# Patient Record
Sex: MALE | Race: BLACK OR AFRICAN AMERICAN | Employment: FULL TIME
[De-identification: names, ages, dates, MRNs, and addresses within clinical notes are randomized per-mention and may not be internally consistent; named-entity substitution may affect disease eponyms.]

---

## 2024-03-15 ENCOUNTER — OFFICE VISIT (OUTPATIENT)
Facility: CLINIC | Age: 64
End: 2024-03-15
Payer: COMMERCIAL

## 2024-03-15 VITALS
RESPIRATION RATE: 20 BRPM | OXYGEN SATURATION: 97 % | WEIGHT: 253 LBS | BODY MASS INDEX: 34.27 KG/M2 | SYSTOLIC BLOOD PRESSURE: 148 MMHG | HEIGHT: 72 IN | TEMPERATURE: 98 F | HEART RATE: 72 BPM | DIASTOLIC BLOOD PRESSURE: 92 MMHG

## 2024-03-15 DIAGNOSIS — Z12.5 SCREENING PSA (PROSTATE SPECIFIC ANTIGEN): ICD-10-CM

## 2024-03-15 DIAGNOSIS — I10 ESSENTIAL HYPERTENSION: ICD-10-CM

## 2024-03-15 DIAGNOSIS — Z79.4 TYPE 2 DIABETES MELLITUS WITHOUT COMPLICATION, WITH LONG-TERM CURRENT USE OF INSULIN (HCC): Primary | ICD-10-CM

## 2024-03-15 DIAGNOSIS — E11.9 TYPE 2 DIABETES MELLITUS WITHOUT COMPLICATION, WITH LONG-TERM CURRENT USE OF INSULIN (HCC): Primary | ICD-10-CM

## 2024-03-15 DIAGNOSIS — Z11.4 ENCOUNTER FOR SCREENING FOR HIV: ICD-10-CM

## 2024-03-15 DIAGNOSIS — E78.5 HYPERLIPIDEMIA, UNSPECIFIED HYPERLIPIDEMIA TYPE: ICD-10-CM

## 2024-03-15 DIAGNOSIS — Z11.59 NEED FOR HEPATITIS C SCREENING TEST: ICD-10-CM

## 2024-03-15 PROCEDURE — 3077F SYST BP >= 140 MM HG: CPT | Performed by: FAMILY MEDICINE

## 2024-03-15 PROCEDURE — 3079F DIAST BP 80-89 MM HG: CPT | Performed by: FAMILY MEDICINE

## 2024-03-15 PROCEDURE — 99215 OFFICE O/P EST HI 40 MIN: CPT | Performed by: FAMILY MEDICINE

## 2024-03-15 RX ORDER — SILDENAFIL 100 MG/1
100 TABLET, FILM COATED ORAL PRN
COMMUNITY
Start: 2024-03-03

## 2024-03-15 SDOH — ECONOMIC STABILITY: FOOD INSECURITY: WITHIN THE PAST 12 MONTHS, YOU WORRIED THAT YOUR FOOD WOULD RUN OUT BEFORE YOU GOT MONEY TO BUY MORE.: NEVER TRUE

## 2024-03-15 SDOH — ECONOMIC STABILITY: HOUSING INSECURITY
IN THE LAST 12 MONTHS, WAS THERE A TIME WHEN YOU DID NOT HAVE A STEADY PLACE TO SLEEP OR SLEPT IN A SHELTER (INCLUDING NOW)?: NO

## 2024-03-15 SDOH — ECONOMIC STABILITY: FOOD INSECURITY: WITHIN THE PAST 12 MONTHS, THE FOOD YOU BOUGHT JUST DIDN'T LAST AND YOU DIDN'T HAVE MONEY TO GET MORE.: NEVER TRUE

## 2024-03-15 SDOH — ECONOMIC STABILITY: INCOME INSECURITY: HOW HARD IS IT FOR YOU TO PAY FOR THE VERY BASICS LIKE FOOD, HOUSING, MEDICAL CARE, AND HEATING?: NOT HARD AT ALL

## 2024-03-15 ASSESSMENT — PATIENT HEALTH QUESTIONNAIRE - PHQ9
SUM OF ALL RESPONSES TO PHQ QUESTIONS 1-9: 0
2. FEELING DOWN, DEPRESSED OR HOPELESS: 0
1. LITTLE INTEREST OR PLEASURE IN DOING THINGS: 0
SUM OF ALL RESPONSES TO PHQ9 QUESTIONS 1 & 2: 0

## 2024-03-15 NOTE — PROGRESS NOTES
Federico Galaviz (:  1960) is a 63 y.o. male,Established patient, here for evaluation of the following chief complaint(s):  Establish Care, Hypertension, and Diabetes         ASSESSMENT/PLAN:  1. Type 2 diabetes mellitus without complication, with long-term current use of insulin (HCC)  -     Hemoglobin A1C; Future  -     Lipid Panel; Future  -     Microalbumin / Creatinine Urine Ratio; Future  -     Comprehensive Metabolic Panel; Future  2. Essential hypertension  -     Lipid Panel; Future  -     Comprehensive Metabolic Panel; Future  3. Hyperlipidemia, unspecified hyperlipidemia type  -     Lipid Panel; Future  -     Comprehensive Metabolic Panel; Future  4. Encounter for screening for HIV  -     HIV 1/2 Ag/Ab, 4TH Generation,W Rflx Confirm; Future  5. Need for hepatitis C screening test  -     Hepatitis C Antibody; Future  6. Screening PSA (prostate specific antigen)  -     PSA Screening; Future    DM- Uncontrolled.  Followed by Endocrinology.  Currently not on Ozempic per patient.  Repeat A1C. Low carbohydrate diet encouraged.  Exercise for 30 minutes  3 to 5 days per week.        HTN- Uncontrolled. Patient reports being in pain.   He needs surgery on both shoulders.  Continue current meds and doses for now. Modify diet.  Limit salt intake to 2 grams  a day.  Advised  aerobic exercise for 30 minutes 3 to 5 times a week.  Take meds consistently as prescribed.     HLD- Stable.   Continue rosuvastatin 20 mg a day.  Decrease fried foods fast foods and saturated fats.           Return in about 4 weeks (around 2024) for HTN, DM, HLD, OV extended.         Subjective   SUBJECTIVE/OBJECTIVE:  64 yo male presents to establish care.   Former patient of [facility].    Hx of diabetes, HLD, BETTE w/o CPAP, arthritis in both knees and right shoulder, and a history of heart murmur.  Patient did complete both cataract eye surgery in  and 2023.    Patient Care Team:

## 2024-03-16 LAB
ALBUMIN SERPL-MCNC: 4.7 G/DL (ref 3.9–4.9)
ALBUMIN/GLOB SERPL: 1.4 {RATIO} (ref 1.2–2.2)
ALP SERPL-CCNC: 82 IU/L (ref 44–121)
ALT SERPL-CCNC: 15 IU/L (ref 0–44)
AST SERPL-CCNC: 18 IU/L (ref 0–40)
BILIRUB SERPL-MCNC: 0.8 MG/DL (ref 0–1.2)
BUN SERPL-MCNC: 21 MG/DL (ref 8–27)
BUN/CREAT SERPL: 20 (ref 10–24)
CALCIUM SERPL-MCNC: 10.5 MG/DL (ref 8.6–10.2)
CHLORIDE SERPL-SCNC: 99 MMOL/L (ref 96–106)
CHOLEST SERPL-MCNC: 186 MG/DL (ref 100–199)
CO2 SERPL-SCNC: 22 MMOL/L (ref 20–29)
CREAT SERPL-MCNC: 1.06 MG/DL (ref 0.76–1.27)
EGFRCR SERPLBLD CKD-EPI 2021: 80 ML/MIN/1.73
GLOBULIN SER CALC-MCNC: 3.3 G/DL (ref 1.5–4.5)
GLUCOSE SERPL-MCNC: 126 MG/DL (ref 70–99)
HBA1C MFR BLD: 7.9 % (ref 4.8–5.6)
HDLC SERPL-MCNC: 58 MG/DL
HIV 1+2 AB+HIV1 P24 AG SERPL QL IA: NON REACTIVE
LDLC SERPL CALC-MCNC: 104 MG/DL (ref 0–99)
POTASSIUM SERPL-SCNC: 3.9 MMOL/L (ref 3.5–5.2)
PROT SERPL-MCNC: 8 G/DL (ref 6–8.5)
PSA SERPL-MCNC: 0.7 NG/ML (ref 0–4)
SODIUM SERPL-SCNC: 142 MMOL/L (ref 134–144)
TRIGL SERPL-MCNC: 140 MG/DL (ref 0–149)
VLDLC SERPL CALC-MCNC: 24 MG/DL (ref 5–40)

## 2024-03-16 ASSESSMENT — ENCOUNTER SYMPTOMS
BACK PAIN: 1
SHORTNESS OF BREATH: 0
ABDOMINAL PAIN: 0

## 2024-03-17 LAB
ALBUMIN/CREAT UR: 6 MG/G CREAT (ref 0–29)
CREAT UR-MCNC: 52.9 MG/DL
MICROALBUMIN UR-MCNC: 3 UG/ML

## 2024-03-20 LAB — HCV IGG SERPL QL IA: REACTIVE

## 2024-03-23 LAB — HCV IGG SERPL QL IA: REACTIVE

## 2024-04-07 DIAGNOSIS — E83.52 HYPERCALCEMIA: ICD-10-CM

## 2024-05-03 ENCOUNTER — PATIENT MESSAGE (OUTPATIENT)
Facility: CLINIC | Age: 64
End: 2024-05-03

## 2024-05-06 RX ORDER — SILDENAFIL 100 MG/1
100 TABLET, FILM COATED ORAL PRN
Qty: 30 TABLET | Refills: 5 | Status: SHIPPED | OUTPATIENT
Start: 2024-05-06

## 2024-05-06 NOTE — TELEPHONE ENCOUNTER
From: Federico Galaviz  To: Dr. Svetlana Culver  Sent: 5/3/2024 8:42 AM EDT  Subject: Sildenafil Citrate 100 MG refill please      [facility]  [address] [place] [address]  Sildenafil Citrate 100 MG take 1 tablet by mouth if needed BEFORE DESRIED SEXUAL ACTIVITY 30  #10 Tablet with 5 refill(s)

## 2024-05-28 ENCOUNTER — PATIENT MESSAGE (OUTPATIENT)
Facility: CLINIC | Age: 64
End: 2024-05-28

## 2024-05-29 DIAGNOSIS — Z79.4 TYPE 2 DIABETES MELLITUS WITHOUT COMPLICATION, WITH LONG-TERM CURRENT USE OF INSULIN (HCC): ICD-10-CM

## 2024-05-29 DIAGNOSIS — E78.5 HYPERLIPIDEMIA, UNSPECIFIED HYPERLIPIDEMIA TYPE: Primary | ICD-10-CM

## 2024-05-29 DIAGNOSIS — E11.9 TYPE 2 DIABETES MELLITUS WITHOUT COMPLICATION, WITH LONG-TERM CURRENT USE OF INSULIN (HCC): ICD-10-CM

## 2024-05-29 RX ORDER — ROSUVASTATIN CALCIUM 20 MG/1
20 TABLET, COATED ORAL DAILY
Qty: 90 TABLET | Refills: 1 | Status: SHIPPED | OUTPATIENT
Start: 2024-05-29

## 2024-05-29 NOTE — TELEPHONE ENCOUNTER
Patient is requesting an refill on    rosuvastatin (CRESTOR) 20 MG tablet [[id number]]    Order Details  Dose: 20 mg Route: Oral Frequency: DAILY   Dispense Quantity: -- Refills: --          Sig: Take 1 tablet by mouth daily     Future Appointments   Date Time Provider Department Center   7/17/2024  8:30 AM Svetlana Culver MD [facility]

## 2024-05-29 NOTE — TELEPHONE ENCOUNTER
Orders Placed This Encounter    rosuvastatin (CRESTOR) 20 MG tablet     Sig: Take 1 tablet by mouth daily     Dispense:  90 tablet     Refill:  1

## 2024-05-30 NOTE — TELEPHONE ENCOUNTER
From: Federico Galaviz  To: Dr. Svetlana Culver  Sent: 5/28/2024 9:41 AM EDT  Subject: rosuvastatin 20 MG     rosuvastatin 20 MG can this prescription be refill , I have two left.   Thanks So Much    Federico Galaviz

## 2024-06-03 DIAGNOSIS — E11.9 TYPE 2 DIABETES MELLITUS WITHOUT COMPLICATION, WITH LONG-TERM CURRENT USE OF INSULIN (HCC): ICD-10-CM

## 2024-06-03 DIAGNOSIS — N52.9 ERECTILE DYSFUNCTION, UNSPECIFIED ERECTILE DYSFUNCTION TYPE: Primary | ICD-10-CM

## 2024-06-03 DIAGNOSIS — E78.5 HYPERLIPIDEMIA, UNSPECIFIED HYPERLIPIDEMIA TYPE: ICD-10-CM

## 2024-06-03 DIAGNOSIS — Z79.4 TYPE 2 DIABETES MELLITUS WITHOUT COMPLICATION, WITH LONG-TERM CURRENT USE OF INSULIN (HCC): ICD-10-CM

## 2024-06-03 RX ORDER — ROSUVASTATIN CALCIUM 20 MG/1
20 TABLET, COATED ORAL DAILY
Qty: 90 TABLET | Refills: 1 | Status: CANCELLED | OUTPATIENT
Start: 2024-06-03

## 2024-06-04 NOTE — TELEPHONE ENCOUNTER
Requested Prescriptions     Pending Prescriptions Disp Refills    sildenafil (VIAGRA) 100 MG tablet 30 tablet 5     Sig: Take 1 tablet by mouth as needed for Erectile Dysfunction    rosuvastatin (CRESTOR) 20 MG tablet 90 tablet 1     Sig: Take 1 tablet by mouth daily     Chart reviewed: rosuvastatin was filled on 05/29/24. PCP notified.

## 2024-06-06 RX ORDER — SILDENAFIL 100 MG/1
100 TABLET, FILM COATED ORAL PRN
Qty: 10 TABLET | Refills: 5 | Status: SHIPPED | OUTPATIENT
Start: 2024-06-06

## 2024-06-06 NOTE — TELEPHONE ENCOUNTER
Orders Placed This Encounter    sildenafil (VIAGRA) 100 MG tablet     Sig: Take 1 tablet by mouth as needed for Erectile Dysfunction     Dispense:  10 tablet     Refill:  5

## 2024-07-17 ENCOUNTER — OFFICE VISIT (OUTPATIENT)
Facility: CLINIC | Age: 64
End: 2024-07-17
Payer: COMMERCIAL

## 2024-07-17 VITALS
RESPIRATION RATE: 12 BRPM | OXYGEN SATURATION: 97 % | HEIGHT: 72 IN | TEMPERATURE: 97.3 F | DIASTOLIC BLOOD PRESSURE: 90 MMHG | WEIGHT: 257 LBS | HEART RATE: 78 BPM | BODY MASS INDEX: 34.81 KG/M2 | SYSTOLIC BLOOD PRESSURE: 140 MMHG

## 2024-07-17 DIAGNOSIS — I10 ESSENTIAL HYPERTENSION: ICD-10-CM

## 2024-07-17 DIAGNOSIS — E11.9 TYPE 2 DIABETES MELLITUS WITHOUT COMPLICATION, WITH LONG-TERM CURRENT USE OF INSULIN (HCC): ICD-10-CM

## 2024-07-17 DIAGNOSIS — E78.5 HYPERLIPIDEMIA, UNSPECIFIED HYPERLIPIDEMIA TYPE: ICD-10-CM

## 2024-07-17 DIAGNOSIS — E83.52 HYPERCALCEMIA: ICD-10-CM

## 2024-07-17 DIAGNOSIS — Z79.4 TYPE 2 DIABETES MELLITUS WITHOUT COMPLICATION, WITH LONG-TERM CURRENT USE OF INSULIN (HCC): Primary | ICD-10-CM

## 2024-07-17 DIAGNOSIS — Z79.4 TYPE 2 DIABETES MELLITUS WITHOUT COMPLICATION, WITH LONG-TERM CURRENT USE OF INSULIN (HCC): ICD-10-CM

## 2024-07-17 DIAGNOSIS — E66.9 OBESITY (BMI 30.0-34.9): ICD-10-CM

## 2024-07-17 DIAGNOSIS — E11.9 TYPE 2 DIABETES MELLITUS WITHOUT COMPLICATION, WITH LONG-TERM CURRENT USE OF INSULIN (HCC): Primary | ICD-10-CM

## 2024-07-17 PROCEDURE — 3051F HG A1C>EQUAL 7.0%<8.0%: CPT | Performed by: FAMILY MEDICINE

## 2024-07-17 PROCEDURE — 3077F SYST BP >= 140 MM HG: CPT | Performed by: FAMILY MEDICINE

## 2024-07-17 PROCEDURE — 3080F DIAST BP >= 90 MM HG: CPT | Performed by: FAMILY MEDICINE

## 2024-07-17 PROCEDURE — 99214 OFFICE O/P EST MOD 30 MIN: CPT | Performed by: FAMILY MEDICINE

## 2024-07-17 ASSESSMENT — ENCOUNTER SYMPTOMS
SHORTNESS OF BREATH: 0
ABDOMINAL PAIN: 0
BACK PAIN: 1

## 2024-07-17 ASSESSMENT — PATIENT HEALTH QUESTIONNAIRE - PHQ9
SUM OF ALL RESPONSES TO PHQ QUESTIONS 1-9: 0
2. FEELING DOWN, DEPRESSED OR HOPELESS: NOT AT ALL
1. LITTLE INTEREST OR PLEASURE IN DOING THINGS: NOT AT ALL
SUM OF ALL RESPONSES TO PHQ QUESTIONS 1-9: 0
SUM OF ALL RESPONSES TO PHQ9 QUESTIONS 1 & 2: 0
SUM OF ALL RESPONSES TO PHQ QUESTIONS 1-9: 0
SUM OF ALL RESPONSES TO PHQ QUESTIONS 1-9: 0

## 2024-07-17 NOTE — PROGRESS NOTES
\"Have you been to the ER, urgent care clinic since your last visit?  Hospitalized since your last visit?\"    YES - When: approximately 2 months ago.  Where and Why: [facility]    “Have you seen or consulted any other health care providers outside of [facility] since your last visit?”    NO        “Have you had a colorectal cancer screening such as a colonoscopy/FIT/Cologuard?    NO    Date of last Colonoscopy: 11/7/2011  No cologuard on file  No FIT/FOBT on file   No flexible sigmoidoscopy on file         Click Here for Release of Records Request   
and fever.   Eyes:  Positive for visual disturbance (has cataracts).   Respiratory:  Negative for shortness of breath.    Cardiovascular:  Negative for chest pain and leg swelling.   Gastrointestinal:  Negative for abdominal pain.   Genitourinary:  Negative for difficulty urinating.   Musculoskeletal:  Positive for arthralgias (both knees) and back pain.   Neurological:  Negative for dizziness, weakness, numbness and headaches.          Objective   BP (!) 140/90 (Site: Right Upper Arm, Position: Sitting, Cuff Size: Thigh)   Pulse 78   Temp 97.3 °F (36.3 °C) (Temporal)   Resp 12   Ht 1.829 m (6')   Wt 116.6 kg (257 lb)   SpO2 97%   BMI 34.86 kg/m²       Physical Exam  Vitals and nursing note reviewed.   Constitutional:       Appearance: Normal appearance.   HENT:      Head: Normocephalic and atraumatic.      Mouth/Throat:      Mouth: Mucous membranes are moist.   Eyes:      Extraocular Movements: Extraocular movements intact.      Pupils: Pupils are equal, round, and reactive to light.   Cardiovascular:      Rate and Rhythm: Normal rate and regular rhythm.   Pulmonary:      Effort: Pulmonary effort is normal.      Breath sounds: Normal breath sounds. No wheezing.   Abdominal:      General: Abdomen is flat.      Palpations: Abdomen is soft.   Musculoskeletal:      Right lower leg: No edema.      Left lower leg: No edema.   Skin:     General: Skin is warm and dry.   Neurological:      Mental Status: He is alert and oriented to person, place, and time.          On this date 7/17/2024 I have spent 33 minutes reviewing previous notes, test results and face to face with the patient discussing the diagnosis and importance of compliance with the treatment plan as well as documenting on the day of the visit.    An electronic signature was used to authenticate this note.    --Svetlana Culver MD

## 2024-07-18 LAB — HBA1C MFR BLD: 7.1 % (ref 4.8–5.6)

## 2024-10-01 DIAGNOSIS — N52.9 ERECTILE DYSFUNCTION, UNSPECIFIED ERECTILE DYSFUNCTION TYPE: ICD-10-CM

## 2024-10-01 NOTE — TELEPHONE ENCOUNTER
Last Appointment:  7/17/2024  Future Appointments   Date Time Provider Department Center   11/20/2024  8:30 AM Svetlana Culver MD [facility] DE[facility]

## 2024-10-02 RX ORDER — SILDENAFIL 100 MG/1
100 TABLET, FILM COATED ORAL PRN
Qty: 10 TABLET | Refills: 5 | OUTPATIENT
Start: 2024-10-02

## 2024-10-22 ENCOUNTER — PATIENT MESSAGE (OUTPATIENT)
Facility: CLINIC | Age: 64
End: 2024-10-22

## 2024-10-22 DIAGNOSIS — I10 ESSENTIAL HYPERTENSION: Primary | ICD-10-CM

## 2024-10-22 RX ORDER — HYDRALAZINE HYDROCHLORIDE 100 MG/1
100 TABLET, FILM COATED ORAL 3 TIMES DAILY
Qty: 270 TABLET | Refills: 1 | Status: SHIPPED | OUTPATIENT
Start: 2024-10-22 | End: 2025-04-20

## 2024-10-23 NOTE — TELEPHONE ENCOUNTER
Orders Placed This Encounter    hydrALAZINE (APRESOLINE) 100 MG tablet     Sig: Take 1 tablet by mouth 3 times daily     Dispense:  270 tablet     Refill:  1

## 2024-11-18 ENCOUNTER — COMMUNITY OUTREACH (OUTPATIENT)
Facility: CLINIC | Age: 64
End: 2024-11-18

## 2024-11-20 ENCOUNTER — HOSPITAL ENCOUNTER (OUTPATIENT)
Facility: HOSPITAL | Age: 64
Setting detail: SPECIMEN
Discharge: HOME | End: 2024-11-23

## 2024-11-20 ENCOUNTER — OFFICE VISIT (OUTPATIENT)
Facility: CLINIC | Age: 64
End: 2024-11-20
Payer: COMMERCIAL

## 2024-11-20 VITALS
BODY MASS INDEX: 35.35 KG/M2 | HEART RATE: 75 BPM | HEIGHT: 72 IN | OXYGEN SATURATION: 98 % | DIASTOLIC BLOOD PRESSURE: 93 MMHG | RESPIRATION RATE: 16 BRPM | WEIGHT: 261 LBS | SYSTOLIC BLOOD PRESSURE: 160 MMHG | TEMPERATURE: 97 F

## 2024-11-20 DIAGNOSIS — N52.9 ERECTILE DYSFUNCTION, UNSPECIFIED ERECTILE DYSFUNCTION TYPE: ICD-10-CM

## 2024-11-20 DIAGNOSIS — I10 ESSENTIAL HYPERTENSION: ICD-10-CM

## 2024-11-20 DIAGNOSIS — E78.5 HYPERLIPIDEMIA, UNSPECIFIED HYPERLIPIDEMIA TYPE: ICD-10-CM

## 2024-11-20 DIAGNOSIS — E66.01 MORBID (SEVERE) OBESITY DUE TO EXCESS CALORIES: ICD-10-CM

## 2024-11-20 DIAGNOSIS — E11.9 TYPE 2 DIABETES MELLITUS WITHOUT COMPLICATION, WITH LONG-TERM CURRENT USE OF INSULIN (HCC): Primary | ICD-10-CM

## 2024-11-20 DIAGNOSIS — Z79.4 TYPE 2 DIABETES MELLITUS WITHOUT COMPLICATION, WITH LONG-TERM CURRENT USE OF INSULIN (HCC): Primary | ICD-10-CM

## 2024-11-20 LAB — LABCORP SPECIMEN COLLECTION: NORMAL

## 2024-11-20 PROCEDURE — 3077F SYST BP >= 140 MM HG: CPT | Performed by: FAMILY MEDICINE

## 2024-11-20 PROCEDURE — 3080F DIAST BP >= 90 MM HG: CPT | Performed by: FAMILY MEDICINE

## 2024-11-20 PROCEDURE — 3051F HG A1C>EQUAL 7.0%<8.0%: CPT | Performed by: FAMILY MEDICINE

## 2024-11-20 PROCEDURE — 99214 OFFICE O/P EST MOD 30 MIN: CPT | Performed by: FAMILY MEDICINE

## 2024-11-20 PROCEDURE — 99001 SPECIMEN HANDLING PT-LAB: CPT

## 2024-11-20 RX ORDER — SILDENAFIL 100 MG/1
100 TABLET, FILM COATED ORAL PRN
Qty: 10 TABLET | Refills: 5 | Status: SHIPPED | OUTPATIENT
Start: 2024-11-20

## 2024-11-20 RX ORDER — ROSUVASTATIN CALCIUM 20 MG/1
20 TABLET, COATED ORAL DAILY
Qty: 90 TABLET | Refills: 1 | Status: SHIPPED | OUTPATIENT
Start: 2024-11-20

## 2024-11-20 ASSESSMENT — ENCOUNTER SYMPTOMS
BACK PAIN: 1
SHORTNESS OF BREATH: 0
ABDOMINAL PAIN: 0

## 2024-11-20 NOTE — PROGRESS NOTES
Federico Galaviz (:  1960) is a 64 y.o. male,Established patient, here for evaluation of the following chief complaint(s):  Hyperlipidemia, Diabetes, and Hypertension         Assessment & Plan  Type 2 diabetes mellitus without complication, with long-term current use of insulin ([facility])     -Good control   -Last A1c 7.1.     -Continue current meds and doses for now. Modify diet.    -Taking Ozempic 1 mg once weekly,   Glipizide ER 10 mg once daily and Lantus 25 units a day.    -Patient stopped taking  metformin  mg once daily d/t GI side effects   (constipation, bloating).    -Limit salt intake to 2 grams  a day.  Advised  aerobic exercise for 30 minutes 3 to 5 times a week.    -Take meds consistently as prescribed.   Orders:    rosuvastatin (CRESTOR) 20 MG tablet; Take 1 tablet by mouth daily    Hemoglobin A1C; Future    Lipid Panel; Future    Comprehensive Metabolic Panel; Future    Essential hypertension     Uncontrolled.     Continue current meds and doses for now. Modify diet.  Limit salt intake to 2 grams  a day.  Advised  aerobic exercise for 30 minutes 3 to 5 times a week.  Take meds consistently as prescribed.   Orders:    Lipid Panel; Future    Comprehensive Metabolic Panel; Future    Hyperlipidemia, unspecified hyperlipidemia type     Stable.   Continue rosuvastatin 20 mg a day.  Decrease fried foods, fast foods and saturated fats.   Orders:    rosuvastatin (CRESTOR) 20 MG tablet; Take 1 tablet by mouth daily    Lipid Panel; Future    Comprehensive Metabolic Panel; Future    Erectile dysfunction, unspecified erectile dysfunction type       Orders:    sildenafil (VIAGRA) 100 MG tablet; Take 1 tablet by mouth as needed for Erectile Dysfunction    Morbid (severe) obesity due to excess calories (E66.01)     Recommend a low calorie diet  -Portion control  -Patient encouraged to exercise for 30 minutes 3 to 5 times a week.  -Recommend eating a well balanced healthy diet. (Consistent of lean meats,

## 2024-11-20 NOTE — ASSESSMENT & PLAN NOTE
Recommend a low calorie diet  -Portion control  -Patient encouraged to exercise for 30 minutes 3 to 5 times a week.  -Recommend eating a well balanced healthy diet. (Consistent of lean meats, lots of fruits, vegetables and whole grains).    -Limit processed foods.   -Drink 32 to 64 ounces of fluid each day  -Eat 2 to 3 g of fiber each day        Patient requests all Lab, Cardiology, and Radiology Results on their Discharge Instructions

## 2024-11-21 LAB
ALBUMIN SERPL-MCNC: 4.5 G/DL (ref 3.9–4.9)
ALP SERPL-CCNC: 78 IU/L (ref 44–121)
ALT SERPL-CCNC: 25 IU/L (ref 0–44)
AST SERPL-CCNC: 22 IU/L (ref 0–40)
BILIRUB SERPL-MCNC: 0.9 MG/DL (ref 0–1.2)
BUN SERPL-MCNC: 14 MG/DL (ref 8–27)
BUN/CREAT SERPL: 13 (ref 10–24)
CALCIUM SERPL-MCNC: 9.5 MG/DL (ref 8.6–10.2)
CHLORIDE SERPL-SCNC: 101 MMOL/L (ref 96–106)
CHOLEST SERPL-MCNC: 136 MG/DL (ref 100–199)
CO2 SERPL-SCNC: 24 MMOL/L (ref 20–29)
CREAT SERPL-MCNC: 1.06 MG/DL (ref 0.76–1.27)
EGFRCR SERPLBLD CKD-EPI 2021: 79 ML/MIN/1.73
GLOBULIN SER CALC-MCNC: 2.9 G/DL (ref 1.5–4.5)
GLUCOSE SERPL-MCNC: 137 MG/DL (ref 70–99)
HBA1C MFR BLD: 7.5 % (ref 4.8–5.6)
HDLC SERPL-MCNC: 48 MG/DL
LDLC SERPL CALC-MCNC: 74 MG/DL (ref 0–99)
POTASSIUM SERPL-SCNC: 4 MMOL/L (ref 3.5–5.2)
PROT SERPL-MCNC: 7.4 G/DL (ref 6–8.5)
SODIUM SERPL-SCNC: 143 MMOL/L (ref 134–144)
SPECIMEN STATUS REPORT: NORMAL
TRIGL SERPL-MCNC: 66 MG/DL (ref 0–149)
VLDLC SERPL CALC-MCNC: 14 MG/DL (ref 5–40)

## 2024-12-02 ENCOUNTER — PATIENT MESSAGE (OUTPATIENT)
Facility: CLINIC | Age: 64
End: 2024-12-02

## 2024-12-02 DIAGNOSIS — I10 ESSENTIAL HYPERTENSION: Primary | ICD-10-CM

## 2024-12-02 RX ORDER — ATENOLOL 100 MG/1
100 TABLET ORAL DAILY
Qty: 90 TABLET | Refills: 1 | Status: SHIPPED | OUTPATIENT
Start: 2024-12-02

## 2024-12-02 RX ORDER — CHLORTHALIDONE 25 MG/1
25 TABLET ORAL DAILY
Qty: 90 TABLET | Refills: 1 | Status: SHIPPED | OUTPATIENT
Start: 2024-12-02

## 2024-12-02 NOTE — TELEPHONE ENCOUNTER
Requested Prescriptions     Pending Prescriptions Disp Refills    chlorthalidone (HYGROTON) 25 MG tablet 90 tablet 1     Sig: Take 1 tablet by mouth daily    atenolol (TENORMIN) 100 MG tablet 90 tablet 1     Sig: Take 1 tablet by mouth daily       [id number] - [place], [place] - [address] [facility] [phone] -  [phone] [address] [facility] [address]  Phone: [phone] Fax: [phone]

## 2024-12-02 NOTE — TELEPHONE ENCOUNTER
Orders Placed This Encounter    chlorthalidone (HYGROTON) 25 MG tablet     Sig: Take 1 tablet by mouth daily     Dispense:  90 tablet     Refill:  1    atenolol (TENORMIN) 100 MG tablet     Sig: Take 1 tablet by mouth daily     Dispense:  90 tablet     Refill:  1

## 2025-03-20 ENCOUNTER — OFFICE VISIT (OUTPATIENT)
Facility: CLINIC | Age: 65
End: 2025-03-20
Payer: COMMERCIAL

## 2025-03-20 VITALS
BODY MASS INDEX: 31.87 KG/M2 | OXYGEN SATURATION: 97 % | TEMPERATURE: 97.3 F | DIASTOLIC BLOOD PRESSURE: 97 MMHG | RESPIRATION RATE: 17 BRPM | HEART RATE: 80 BPM | HEIGHT: 72 IN | SYSTOLIC BLOOD PRESSURE: 159 MMHG | WEIGHT: 235.3 LBS

## 2025-03-20 DIAGNOSIS — E11.9 TYPE 2 DIABETES MELLITUS WITHOUT COMPLICATION, WITH LONG-TERM CURRENT USE OF INSULIN: Primary | ICD-10-CM

## 2025-03-20 DIAGNOSIS — E66.811 CLASS 1 OBESITY DUE TO EXCESS CALORIES WITH SERIOUS COMORBIDITY AND BODY MASS INDEX (BMI) OF 31.0 TO 31.9 IN ADULT: ICD-10-CM

## 2025-03-20 DIAGNOSIS — Z79.4 TYPE 2 DIABETES MELLITUS WITHOUT COMPLICATION, WITH LONG-TERM CURRENT USE OF INSULIN: Primary | ICD-10-CM

## 2025-03-20 DIAGNOSIS — E78.5 HYPERLIPIDEMIA, UNSPECIFIED HYPERLIPIDEMIA TYPE: ICD-10-CM

## 2025-03-20 DIAGNOSIS — I10 ESSENTIAL HYPERTENSION: ICD-10-CM

## 2025-03-20 DIAGNOSIS — E66.09 CLASS 1 OBESITY DUE TO EXCESS CALORIES WITH SERIOUS COMORBIDITY AND BODY MASS INDEX (BMI) OF 31.0 TO 31.9 IN ADULT: ICD-10-CM

## 2025-03-20 PROCEDURE — 3080F DIAST BP >= 90 MM HG: CPT | Performed by: FAMILY MEDICINE

## 2025-03-20 PROCEDURE — 99215 OFFICE O/P EST HI 40 MIN: CPT | Performed by: FAMILY MEDICINE

## 2025-03-20 PROCEDURE — 3077F SYST BP >= 140 MM HG: CPT | Performed by: FAMILY MEDICINE

## 2025-03-20 RX ORDER — ROSUVASTATIN CALCIUM 20 MG/1
20 TABLET, COATED ORAL DAILY
Qty: 90 TABLET | Refills: 1 | Status: SHIPPED | OUTPATIENT
Start: 2025-03-20

## 2025-03-20 RX ORDER — ATENOLOL 100 MG/1
100 TABLET ORAL DAILY
Qty: 90 TABLET | Refills: 1 | Status: SHIPPED | OUTPATIENT
Start: 2025-03-20

## 2025-03-20 RX ORDER — SPIRONOLACTONE 25 MG/1
25 TABLET ORAL DAILY
Qty: 30 TABLET | Refills: 1 | Status: SHIPPED | OUTPATIENT
Start: 2025-03-20

## 2025-03-20 RX ORDER — CHLORTHALIDONE 25 MG/1
25 TABLET ORAL DAILY
Qty: 90 TABLET | Refills: 1 | Status: SHIPPED | OUTPATIENT
Start: 2025-03-20

## 2025-03-20 SDOH — ECONOMIC STABILITY: FOOD INSECURITY: WITHIN THE PAST 12 MONTHS, YOU WORRIED THAT YOUR FOOD WOULD RUN OUT BEFORE YOU GOT MONEY TO BUY MORE.: NEVER TRUE

## 2025-03-20 SDOH — ECONOMIC STABILITY: FOOD INSECURITY: WITHIN THE PAST 12 MONTHS, THE FOOD YOU BOUGHT JUST DIDN'T LAST AND YOU DIDN'T HAVE MONEY TO GET MORE.: NEVER TRUE

## 2025-03-20 ASSESSMENT — ENCOUNTER SYMPTOMS
ABDOMINAL PAIN: 0
BACK PAIN: 1
SHORTNESS OF BREATH: 0

## 2025-03-20 ASSESSMENT — PATIENT HEALTH QUESTIONNAIRE - PHQ9
SUM OF ALL RESPONSES TO PHQ QUESTIONS 1-9: 0
SUM OF ALL RESPONSES TO PHQ QUESTIONS 1-9: 0
1. LITTLE INTEREST OR PLEASURE IN DOING THINGS: NOT AT ALL
2. FEELING DOWN, DEPRESSED OR HOPELESS: NOT AT ALL
SUM OF ALL RESPONSES TO PHQ QUESTIONS 1-9: 0
SUM OF ALL RESPONSES TO PHQ QUESTIONS 1-9: 0

## 2025-03-20 NOTE — ASSESSMENT & PLAN NOTE
-Uncontrolled.     -Continue current meds and doses for now. Modify diet.  Limit salt intake to 2 grams  a day.  Advised  aerobic exercise for 30 minutes 3 to 5 times a week.  -Take meds consistently as prescribed.   Orders:    atenolol (TENORMIN) 100 MG tablet; Take 1 tablet by mouth daily    chlorthalidone (HYGROTON) 25 MG tablet; Take 1 tablet by mouth daily    Lipid Panel; Future    Comprehensive Metabolic Panel; Future

## 2025-03-20 NOTE — ASSESSMENT & PLAN NOTE
-Good control   -Last A1c 7.5 in November 2024.     -Continue current meds and doses for now. Modify diet.    -Taking Ozempic 1 mg once weekly,   Glipizide ER 10 mg once daily and Lantus 25 units a day.    -Patient stopped taking  metformin  mg once daily d/t GI side effects   (constipation, bloating).    -Limit salt intake to 2 grams  a day.  Advised  aerobic exercise for 30 minutes 3 to 5 times a week.   Orders:    rosuvastatin (CRESTOR) 20 MG tablet; Take 1 tablet by mouth daily    Hemoglobin A1C; Future    Lipid Panel; Future    Albumin/Creatinine Ratio, Urine; Future    Comprehensive Metabolic Panel; Future

## 2025-03-20 NOTE — PROGRESS NOTES
\"Have you been to the ER, urgent care clinic since your last visit?  Hospitalized since your last visit?\"    NO    “Have you seen or consulted any other health care providers outside our system since your last visit?”    NO    “Have you had a colorectal cancer screening such as a colonoscopy/FIT/Cologuard?    NO    Date of last Colonoscopy: 11/7/2011  No cologuard on file  No FIT/FOBT on file   No flexible sigmoidoscopy on file     “Have you had a diabetic eye exam?”    Yes, EVMS in the diabetic clinic     No diabetic eye exam on file          
reports that he has never smoked. He has never been exposed to tobacco smoke. He has never used smokeless tobacco.  He  reports that he does not currently use alcohol after a past usage of about 1.0 standard drink of alcohol per week.      Review of Systems   Constitutional:  Negative for chills and fever.   Eyes:  Positive for visual disturbance (has cataracts).   Respiratory:  Negative for shortness of breath.    Cardiovascular:  Negative for chest pain and leg swelling.   Gastrointestinal:  Negative for abdominal pain.   Genitourinary:  Negative for difficulty urinating.   Musculoskeletal:  Positive for arthralgias (both knees) and back pain.   Neurological:  Negative for dizziness, weakness, numbness and headaches.          Objective  BP (!) 159/97 (BP Site: Right Upper Arm, Patient Position: Sitting, BP Cuff Size: Medium Adult)   Pulse 80   Temp 97.3 °F (36.3 °C) (Temporal)   Resp 17   Ht 1.829 m (6')   Wt 106.7 kg (235 lb 4.8 oz)   SpO2 97%   BMI 31.91 kg/m²     Physical Exam  Vitals and nursing note reviewed.   Constitutional:       Appearance: Normal appearance.   HENT:      Head: Normocephalic and atraumatic.      Mouth/Throat:      Mouth: Mucous membranes are moist.   Eyes:      Extraocular Movements: Extraocular movements intact.      Pupils: Pupils are equal, round, and reactive to light.   Cardiovascular:      Rate and Rhythm: Normal rate and regular rhythm.   Pulmonary:      Effort: Pulmonary effort is normal.      Breath sounds: Normal breath sounds. No wheezing.   Abdominal:      General: Abdomen is flat.      Palpations: Abdomen is soft.   Musculoskeletal:      Right lower leg: No edema.      Left lower leg: No edema.   Skin:     General: Skin is warm and dry.   Neurological:      Mental Status: He is alert and oriented to person, place, and time.                  An electronic signature was used to authenticate this note.    --Svetlana Culver MD

## 2025-03-20 NOTE — ASSESSMENT & PLAN NOTE
-Recommend a low calorie diet  -Portion control  -Patient encouraged to exercise for 30 minutes 3 to 5 times a week.   -Recommend eating a well balanced healthy diet. (Consistent of lean meats, lots of fruits, vegetables and whole grains).    -Limit processed foods.   -Drink 32 to 64 ounces of fluid each day  -Eat 2 to 3 g of fiber each day

## 2025-03-20 NOTE — ASSESSMENT & PLAN NOTE
-Stable.   Continue rosuvastatin 20 mg a day.  Decrease fried foods, fast foods and saturated fats.   Orders:    rosuvastatin (CRESTOR) 20 MG tablet; Take 1 tablet by mouth daily    Lipid Panel; Future    Comprehensive Metabolic Panel; Future

## 2025-03-21 LAB
ALBUMIN SERPL-MCNC: 4.6 G/DL (ref 3.9–4.9)
ALBUMIN/CREAT UR: 18 MG/G CREAT (ref 0–29)
ALP SERPL-CCNC: 89 IU/L (ref 44–121)
ALT SERPL-CCNC: 12 IU/L (ref 0–44)
AST SERPL-CCNC: 13 IU/L (ref 0–40)
BILIRUB SERPL-MCNC: 0.9 MG/DL (ref 0–1.2)
BUN SERPL-MCNC: 15 MG/DL (ref 8–27)
BUN/CREAT SERPL: 15 (ref 10–24)
CALCIUM SERPL-MCNC: 9.8 MG/DL (ref 8.6–10.2)
CHLORIDE SERPL-SCNC: 98 MMOL/L (ref 96–106)
CHOLEST SERPL-MCNC: 148 MG/DL (ref 100–199)
CO2 SERPL-SCNC: 26 MMOL/L (ref 20–29)
CREAT SERPL-MCNC: 1 MG/DL (ref 0.76–1.27)
CREAT UR-MCNC: 100.6 MG/DL
EGFRCR SERPLBLD CKD-EPI 2021: 85 ML/MIN/1.73
GLOBULIN SER CALC-MCNC: 3.2 G/DL (ref 1.5–4.5)
GLUCOSE SERPL-MCNC: 78 MG/DL (ref 70–99)
HBA1C MFR BLD: 6.9 % (ref 4.8–5.6)
HDLC SERPL-MCNC: 49 MG/DL
LDLC SERPL CALC-MCNC: 86 MG/DL (ref 0–99)
MICROALBUMIN UR-MCNC: 18.6 UG/ML
POTASSIUM SERPL-SCNC: 4.3 MMOL/L (ref 3.5–5.2)
PROT SERPL-MCNC: 7.8 G/DL (ref 6–8.5)
SODIUM SERPL-SCNC: 140 MMOL/L (ref 134–144)
TRIGL SERPL-MCNC: 67 MG/DL (ref 0–149)
VLDLC SERPL CALC-MCNC: 13 MG/DL (ref 5–40)

## 2025-04-08 ENCOUNTER — RESULTS FOLLOW-UP (OUTPATIENT)
Facility: CLINIC | Age: 65
End: 2025-04-08

## 2025-04-17 DIAGNOSIS — I10 ESSENTIAL HYPERTENSION: ICD-10-CM

## 2025-04-17 RX ORDER — HYDRALAZINE HYDROCHLORIDE 100 MG/1
100 TABLET, FILM COATED ORAL 3 TIMES DAILY
Qty: 270 TABLET | Refills: 1 | Status: SHIPPED | OUTPATIENT
Start: 2025-04-17 | End: 2025-10-14

## 2025-04-17 NOTE — TELEPHONE ENCOUNTER
Last Appointment:  3/20/2025  Future Appointments   Date Time Provider Department Center   5/1/2025  9:45 AM Svetlana Culver MD [facility] DEP   7/23/2025  8:30 AM Svetlana Culver MD [facility] DEP

## 2025-05-01 ENCOUNTER — OFFICE VISIT (OUTPATIENT)
Facility: CLINIC | Age: 65
End: 2025-05-01
Payer: COMMERCIAL

## 2025-05-01 VITALS
HEART RATE: 83 BPM | DIASTOLIC BLOOD PRESSURE: 85 MMHG | TEMPERATURE: 97.5 F | OXYGEN SATURATION: 96 % | RESPIRATION RATE: 17 BRPM | BODY MASS INDEX: 33.86 KG/M2 | HEIGHT: 72 IN | WEIGHT: 250 LBS | SYSTOLIC BLOOD PRESSURE: 136 MMHG

## 2025-05-01 DIAGNOSIS — E66.09 CLASS 1 OBESITY DUE TO EXCESS CALORIES WITH SERIOUS COMORBIDITY AND BODY MASS INDEX (BMI) OF 33.0 TO 33.9 IN ADULT: ICD-10-CM

## 2025-05-01 DIAGNOSIS — I10 ESSENTIAL HYPERTENSION: Primary | ICD-10-CM

## 2025-05-01 DIAGNOSIS — E11.9 TYPE 2 DIABETES MELLITUS WITHOUT COMPLICATION, WITH LONG-TERM CURRENT USE OF INSULIN (HCC): ICD-10-CM

## 2025-05-01 DIAGNOSIS — E66.811 CLASS 1 OBESITY DUE TO EXCESS CALORIES WITH SERIOUS COMORBIDITY AND BODY MASS INDEX (BMI) OF 33.0 TO 33.9 IN ADULT: ICD-10-CM

## 2025-05-01 DIAGNOSIS — Z79.4 TYPE 2 DIABETES MELLITUS WITHOUT COMPLICATION, WITH LONG-TERM CURRENT USE OF INSULIN (HCC): ICD-10-CM

## 2025-05-01 DIAGNOSIS — E78.5 HYPERLIPIDEMIA, UNSPECIFIED HYPERLIPIDEMIA TYPE: ICD-10-CM

## 2025-05-01 PROCEDURE — 99214 OFFICE O/P EST MOD 30 MIN: CPT | Performed by: FAMILY MEDICINE

## 2025-05-01 PROCEDURE — 3079F DIAST BP 80-89 MM HG: CPT | Performed by: FAMILY MEDICINE

## 2025-05-01 PROCEDURE — 3075F SYST BP GE 130 - 139MM HG: CPT | Performed by: FAMILY MEDICINE

## 2025-05-01 PROCEDURE — 3044F HG A1C LEVEL LT 7.0%: CPT | Performed by: FAMILY MEDICINE

## 2025-05-01 SDOH — ECONOMIC STABILITY: FOOD INSECURITY: WITHIN THE PAST 12 MONTHS, YOU WORRIED THAT YOUR FOOD WOULD RUN OUT BEFORE YOU GOT MONEY TO BUY MORE.: NEVER TRUE

## 2025-05-01 SDOH — ECONOMIC STABILITY: FOOD INSECURITY: WITHIN THE PAST 12 MONTHS, THE FOOD YOU BOUGHT JUST DIDN'T LAST AND YOU DIDN'T HAVE MONEY TO GET MORE.: NEVER TRUE

## 2025-05-01 ASSESSMENT — ENCOUNTER SYMPTOMS
ABDOMINAL PAIN: 0
BACK PAIN: 1
SHORTNESS OF BREATH: 0

## 2025-05-01 ASSESSMENT — PATIENT HEALTH QUESTIONNAIRE - PHQ9
SUM OF ALL RESPONSES TO PHQ QUESTIONS 1-9: 0
1. LITTLE INTEREST OR PLEASURE IN DOING THINGS: NOT AT ALL
SUM OF ALL RESPONSES TO PHQ QUESTIONS 1-9: 0
2. FEELING DOWN, DEPRESSED OR HOPELESS: NOT AT ALL
SUM OF ALL RESPONSES TO PHQ QUESTIONS 1-9: 0
SUM OF ALL RESPONSES TO PHQ QUESTIONS 1-9: 0

## 2025-05-01 NOTE — ASSESSMENT & PLAN NOTE
-Stable  -Last A1c 6.9 in March 2025 decreased from 7.5 in November 2024  - On Ozempic 1 mg once weekly, glipizide ER 10 mg once daily and Lantus 25 units a day  -Patient stopped taking metformin  mg once daily due to side effects (constipation, bloating)  - Continue to modify diet and the carbohydrates.  - Advised aerobic exercise for 30 minutes 5 times a week.

## 2025-05-01 NOTE — ASSESSMENT & PLAN NOTE
- Better controlled  - On hydralazine 100 mg 3 times daily, atenolol 100 mg once daily, chlorthalidone 25 mg once daily, spironolactone 25 mg once daily, and losartan 100 mg once daily

## 2025-05-01 NOTE — PROGRESS NOTES
Have you been to the ER, urgent care clinic since your last visit?  Hospitalized since your last visit?   NO    Have you seen or consulted any other health care providers outside our system since your last visit?   NO    “Have you had a colorectal cancer screening such as a colonoscopy/FIT/Cologuard?    NO    Date of last Colonoscopy: 11/7/2011  No cologuard on file  No FIT/FOBT on file   No flexible sigmoidoscopy on file

## 2025-05-01 NOTE — PROGRESS NOTES
Federico Galaviz (:  1960) is a 64 y.o. male,Established patient, here for evaluation of the following chief complaint(s):  Hypertension and Blood Pressure Check         Assessment & Plan  Essential hypertension  - Better controlled  - On hydralazine 100 mg 3 times daily, atenolol 100 mg once daily, chlorthalidone 25 mg once daily, spironolactone 25 mg once daily, and losartan 100 mg once daily       Hyperlipidemia, unspecified hyperlipidemia type  -Stable.  Continue rosuvastatin 20 mg once daily.  -Recommend decreasing fried foods, fast foods and saturated fats.         Type 2 diabetes mellitus without complication, with long-term current use of insulin (HCC)  -Stable  -Last A1c 6.9 in 2025 decreased from 7.5 in 2024  - On Ozempic 1 mg once weekly, glipizide ER 10 mg once daily and Lantus 25 units a day  -Patient stopped taking metformin  mg once daily due to side effects (constipation, bloating)  - Continue to modify diet and the carbohydrates.  - Advised aerobic exercise for 30 minutes 5 times a week.       Class 1 obesity due to excess calories with serious comorbidity and body mass index (BMI) of 33.0 to 33.9 in adult  -Recommend a low calorie diet  -Patient encouraged to exercise for 30 minutes 3 to 5 times a week.    -Recommend eating a well balanced healthy diet. (Consistent of lean meats, lots of fruits, vegetables and whole grains).    -Limit processed foods.   -Drink 32 to 64 ounces of fluid each day  -Eat 2 to 3 g of fiber each day         Return in about 4 months (around 2025) for HTN, DM, HLD, BETTE, OV extended.       Subjective   63 yo male here for follow up.  Hx of diabetes, HLD, BETTE w/o CPAP, arthritis in both knees and right shoulder, and a history of heart murmur.    Patient following up on his HTN today.   His Bp was high on last OV and spironolactone 25 mg was added.   Patient tolerating the medication.     He has no CP, SOB, edema, or dizziness.

## 2025-05-01 NOTE — ASSESSMENT & PLAN NOTE
-Stable.  Continue rosuvastatin 20 mg once daily.  -Recommend decreasing fried foods, fast foods and saturated fats.

## 2025-05-04 RX ORDER — INSULIN GLARGINE 100 [IU]/ML
25 INJECTION, SOLUTION SUBCUTANEOUS NIGHTLY
COMMUNITY

## 2025-05-14 NOTE — TELEPHONE ENCOUNTER
Mr. Galaviz is requesting refills of:    Requested Prescriptions     Pending Prescriptions Disp Refills    spironolactone (ALDACTONE) 25 MG tablet 30 tablet 1     Sig: Take 1 tablet by mouth daily         to be sent to   [id number] - [place], [place] - [address] [place] [address] -  [phone] - F [phone]  [address] [place] [id number]  Phone: [phone] Fax: [phone]  .     LAST OFFICE VISIT:  5/1/2025     UPCOMING APPOINTMENT(S):  Future Appointments   Date Time Provider Department Center   7/23/2025  8:30 AM Svetlana Culver MD [facility] DEP   9/2/2025  9:45 AM Svetlana Culver MD [facility] DEP       Patient offered an appointment? no  How much medication does the patient have on hand? unknown    Provided notified

## 2025-05-16 RX ORDER — SPIRONOLACTONE 25 MG/1
25 TABLET ORAL DAILY
Qty: 30 TABLET | Refills: 1 | Status: SHIPPED | OUTPATIENT
Start: 2025-05-16

## 2025-05-16 NOTE — TELEPHONE ENCOUNTER
Orders Placed This Encounter    spironolactone (ALDACTONE) 25 MG tablet     Sig: Take 1 tablet by mouth daily     Dispense:  30 tablet     Refill:  1

## 2025-06-03 ENCOUNTER — PATIENT MESSAGE (OUTPATIENT)
Facility: CLINIC | Age: 65
End: 2025-06-03

## 2025-06-04 DIAGNOSIS — N52.9 ERECTILE DYSFUNCTION, UNSPECIFIED ERECTILE DYSFUNCTION TYPE: ICD-10-CM

## 2025-06-04 RX ORDER — SILDENAFIL 100 MG/1
100 TABLET, FILM COATED ORAL PRN
Qty: 10 TABLET | Refills: 5 | Status: SHIPPED | OUTPATIENT
Start: 2025-06-04

## 2025-06-04 NOTE — TELEPHONE ENCOUNTER
Mr. Galaviz is requesting refills of:    Requested Prescriptions     Pending Prescriptions Disp Refills    sildenafil (VIAGRA) 100 MG tablet 10 tablet 5     Sig: Take 1 tablet by mouth as needed for Erectile Dysfunction         to be sent to   [facility] #[id number] - [place], [place] - [address] [facility] - P [phone] - F [phone]  [address] [facility] [address]  Phone: [phone] Fax: [phone]  .     LAST OFFICE VISIT:  5/1/2025     UPCOMING APPOINTMENT(S):  Future Appointments   Date Time Provider Department Center   7/23/2025  8:30 AM Svetlana Culver MD [facility] DEP   9/2/2025  9:45 AM Svetlana Culver MD [facility] DEP       Patient offered an appointment? no  How much medication does the patient have on hand? no    Provided notified

## 2025-07-23 ENCOUNTER — OFFICE VISIT (OUTPATIENT)
Facility: CLINIC | Age: 65
End: 2025-07-23
Payer: COMMERCIAL

## 2025-07-23 VITALS
SYSTOLIC BLOOD PRESSURE: 138 MMHG | RESPIRATION RATE: 18 BRPM | TEMPERATURE: 97.1 F | DIASTOLIC BLOOD PRESSURE: 80 MMHG | BODY MASS INDEX: 34.48 KG/M2 | WEIGHT: 254.6 LBS | HEIGHT: 72 IN | HEART RATE: 86 BPM | OXYGEN SATURATION: 95 %

## 2025-07-23 DIAGNOSIS — Z79.4 TYPE 2 DIABETES MELLITUS WITHOUT COMPLICATION, WITH LONG-TERM CURRENT USE OF INSULIN (HCC): ICD-10-CM

## 2025-07-23 DIAGNOSIS — E66.09 CLASS 1 OBESITY DUE TO EXCESS CALORIES WITH SERIOUS COMORBIDITY AND BODY MASS INDEX (BMI) OF 34.0 TO 34.9 IN ADULT: ICD-10-CM

## 2025-07-23 DIAGNOSIS — E11.9 TYPE 2 DIABETES MELLITUS WITHOUT COMPLICATION, WITH LONG-TERM CURRENT USE OF INSULIN (HCC): ICD-10-CM

## 2025-07-23 DIAGNOSIS — E78.5 HYPERLIPIDEMIA, UNSPECIFIED HYPERLIPIDEMIA TYPE: ICD-10-CM

## 2025-07-23 DIAGNOSIS — E66.811 CLASS 1 OBESITY DUE TO EXCESS CALORIES WITH SERIOUS COMORBIDITY AND BODY MASS INDEX (BMI) OF 34.0 TO 34.9 IN ADULT: ICD-10-CM

## 2025-07-23 DIAGNOSIS — I10 ESSENTIAL HYPERTENSION: Primary | ICD-10-CM

## 2025-07-23 PROCEDURE — 99213 OFFICE O/P EST LOW 20 MIN: CPT | Performed by: FAMILY MEDICINE

## 2025-07-23 PROCEDURE — 3075F SYST BP GE 130 - 139MM HG: CPT | Performed by: FAMILY MEDICINE

## 2025-07-23 PROCEDURE — 3044F HG A1C LEVEL LT 7.0%: CPT | Performed by: FAMILY MEDICINE

## 2025-07-23 PROCEDURE — 3079F DIAST BP 80-89 MM HG: CPT | Performed by: FAMILY MEDICINE

## 2025-07-23 SDOH — ECONOMIC STABILITY: FOOD INSECURITY: WITHIN THE PAST 12 MONTHS, YOU WORRIED THAT YOUR FOOD WOULD RUN OUT BEFORE YOU GOT MONEY TO BUY MORE.: NEVER TRUE

## 2025-07-23 SDOH — ECONOMIC STABILITY: FOOD INSECURITY: WITHIN THE PAST 12 MONTHS, THE FOOD YOU BOUGHT JUST DIDN'T LAST AND YOU DIDN'T HAVE MONEY TO GET MORE.: NEVER TRUE

## 2025-07-23 ASSESSMENT — PATIENT HEALTH QUESTIONNAIRE - PHQ9
2. FEELING DOWN, DEPRESSED OR HOPELESS: NOT AT ALL
SUM OF ALL RESPONSES TO PHQ QUESTIONS 1-9: 0
1. LITTLE INTEREST OR PLEASURE IN DOING THINGS: NOT AT ALL
SUM OF ALL RESPONSES TO PHQ QUESTIONS 1-9: 0

## 2025-07-23 ASSESSMENT — ENCOUNTER SYMPTOMS
ABDOMINAL PAIN: 0
BACK PAIN: 1

## 2025-07-23 NOTE — PROGRESS NOTES
Have you been to the ER, urgent care clinic since your last visit?  Hospitalized since your last visit?   NO    Have you seen or consulted any other health care providers outside our system since your last visit?   NO    “Have you had a colorectal cancer screening such as a colonoscopy/FIT/Cologuard?    NO    Date of last Colonoscopy: 11/7/2011  No cologuard on file  No FIT/FOBT on file   No flexible sigmoidoscopy on file          
tablet by mouth daily    insulin glargine (LANTUS SOLOSTAR) 100 UNIT/ML injection pen Inject 25 Units into the skin nightly    hydrALAZINE (APRESOLINE) 100 MG tablet Take 1 tablet by mouth 3 times daily    atenolol (TENORMIN) 100 MG tablet Take 1 tablet by mouth daily    chlorthalidone (HYGROTON) 25 MG tablet Take 1 tablet by mouth daily    rosuvastatin (CRESTOR) 20 MG tablet Take 1 tablet by mouth daily    meloxicam (MOBIC) 15 MG tablet Take 1 tablet by mouth daily    losartan (COZAAR) 100 MG tablet Take 1 tablet by mouth daily    Semaglutide, 1 MG/DOSE, (OZEMPIC, 1 MG/DOSE,) 4 MG/3ML SOPN Inject into the skin every 7 days    glipiZIDE (GLUCOTROL XL) 10 MG extended release tablet Take 1 tablet by mouth daily     No current facility-administered medications for this visit.         Allergies and Intolerances:   Allergies   Allergen Reactions    Amlodipine Besylate Shortness Of Breath    Lisinopril Anaphylaxis    Simvastatin Other (See Comments)    Metformin Nausea And Vomiting       Family History:   Family History   Problem Relation Age of Onset    Diabetes Mother     Heart Attack Mother     High Blood Pressure Mother     High Cholesterol Mother     Obesity Mother     Sickle Cell Trait Mother     Stroke Mother        Social History:   He  reports that he has never smoked. He has never been exposed to tobacco smoke. He has never used smokeless tobacco.  He  reports that he does not currently use alcohol after a past usage of about 1.0 standard drink of alcohol per week.        Review of Systems   Constitutional:  Negative for chills and fever.   Eyes:  Positive for visual disturbance (has cataracts).   Respiratory:  Negative for shortness of breath.    Cardiovascular:  Negative for chest pain and leg swelling.   Gastrointestinal:  Negative for abdominal pain.   Genitourinary:  Negative for difficulty urinating.   Musculoskeletal:  Positive for arthralgias (both knees) and back pain.   Neurological:  Negative for

## 2025-07-23 NOTE — ASSESSMENT & PLAN NOTE
- Controlled  - Continue hydralazine 100 mg 3 times daily, atenolol 100 mg once daily, chlorthalidone 25 mg once daily, spironolactone 25 mg once daily, and losartan 100 mg once daily  - Continue to modify her salt intake to exercise  for 150 minutes a week

## 2025-07-24 LAB
EST. AVERAGE GLUCOSE BLD GHB EST-MCNC: 157 MG/DL
HBA1C MFR BLD: 7.1 % (ref 4.8–5.6)

## 2025-07-30 NOTE — TELEPHONE ENCOUNTER
Mr. Galaviz is requesting refills of:    Requested Prescriptions     Pending Prescriptions Disp Refills    spironolactone (ALDACTONE) 25 MG tablet 30 tablet 1     Sig: Take 1 tablet by mouth daily         to be sent to   [facility]/pharm[facility] #[id number] - [place], [place] - [address] [facility] - P [phone] - F [phone]  [address] [facility] [address]  Phone: [phone] Fax: [phone]  .     LAST OFFICE VISIT:  7/23/2025     UPCOMING APPOINTMENT(S):  Future Appointments   Date Time Provider Department Center   11/25/2025  8:30 AM Svetlana Culver MD [facility] DEP         Provided notified

## 2025-07-31 RX ORDER — SPIRONOLACTONE 25 MG/1
25 TABLET ORAL DAILY
Qty: 90 TABLET | Refills: 1 | Status: SHIPPED | OUTPATIENT
Start: 2025-07-31

## 2025-07-31 NOTE — TELEPHONE ENCOUNTER
Orders Placed This Encounter    spironolactone (ALDACTONE) 25 MG tablet     Sig: Take 1 tablet by mouth daily     Dispense:  90 tablet     Refill:  1